# Patient Record
Sex: FEMALE | Race: BLACK OR AFRICAN AMERICAN | NOT HISPANIC OR LATINO
[De-identification: names, ages, dates, MRNs, and addresses within clinical notes are randomized per-mention and may not be internally consistent; named-entity substitution may affect disease eponyms.]

---

## 2019-09-30 ENCOUNTER — APPOINTMENT (OUTPATIENT)
Dept: ANTEPARTUM | Facility: CLINIC | Age: 34
End: 2019-09-30
Payer: MEDICAID

## 2019-09-30 ENCOUNTER — ASOB RESULT (OUTPATIENT)
Age: 34
End: 2019-09-30

## 2019-09-30 PROCEDURE — 76813 OB US NUCHAL MEAS 1 GEST: CPT

## 2019-09-30 PROCEDURE — 36416 COLLJ CAPILLARY BLOOD SPEC: CPT

## 2019-10-02 ENCOUNTER — APPOINTMENT (OUTPATIENT)
Dept: MATERNAL FETAL MEDICINE | Facility: CLINIC | Age: 34
End: 2019-10-02
Payer: MEDICAID

## 2019-10-02 ENCOUNTER — ASOB RESULT (OUTPATIENT)
Age: 34
End: 2019-10-02

## 2019-10-02 DIAGNOSIS — Z78.9 OTHER SPECIFIED HEALTH STATUS: ICD-10-CM

## 2019-10-02 DIAGNOSIS — R73.03 PREDIABETES.: ICD-10-CM

## 2019-10-02 DIAGNOSIS — Z83.3 FAMILY HISTORY OF DIABETES MELLITUS: ICD-10-CM

## 2019-10-02 PROCEDURE — G0108 DIAB MANAGE TRN  PER INDIV: CPT

## 2019-10-02 RX ORDER — ISOPROPYL ALCOHOL 0.7 ML/ML
SWAB TOPICAL
Qty: 1 | Refills: 2 | Status: ACTIVE | COMMUNITY
Start: 2019-10-02 | End: 1900-01-01

## 2019-10-02 RX ORDER — BLOOD SUGAR DIAGNOSTIC
STRIP MISCELLANEOUS
Qty: 150 | Refills: 2 | Status: ACTIVE | COMMUNITY
Start: 2019-10-02 | End: 1900-01-01

## 2019-10-02 RX ORDER — LANCETS
EACH MISCELLANEOUS
Qty: 150 | Refills: 2 | Status: ACTIVE | COMMUNITY
Start: 2019-10-02 | End: 1900-01-01

## 2019-10-03 VITALS — BODY MASS INDEX: 39.46 KG/M2 | WEIGHT: 245.5 LBS | HEIGHT: 66 IN

## 2019-10-03 PROBLEM — Z78.9 DOES NOT USE ILLICIT DRUGS: Status: ACTIVE | Noted: 2019-10-03

## 2019-10-03 PROBLEM — R73.03 PRE-DIABETES: Status: RESOLVED | Noted: 2019-10-03 | Resolved: 2019-10-03

## 2019-10-03 PROBLEM — Z78.9 DENIES ALCOHOL CONSUMPTION: Status: ACTIVE | Noted: 2019-10-03

## 2019-10-03 PROBLEM — Z83.3 FAMILY HISTORY OF DIABETES MELLITUS: Status: ACTIVE | Noted: 2019-10-03

## 2019-10-07 LAB
1ST TRIMESTER DATA: NORMAL
ADDENDUM DOC: NORMAL
AFP PNL SERPL: NORMAL
AFP SERPL-ACNC: NORMAL
CLINICAL BIOCHEMIST REVIEW: NORMAL
FREE BETA HCG 1ST TRIMESTER: NORMAL
Lab: NORMAL
NOTES NTD: NORMAL
NT: NORMAL
PAPP-A SERPL-ACNC: NORMAL
TRISOMY 18/3: NORMAL

## 2019-10-12 ENCOUNTER — APPOINTMENT (OUTPATIENT)
Dept: MATERNAL FETAL MEDICINE | Facility: CLINIC | Age: 34
End: 2019-10-12
Payer: MEDICAID

## 2019-10-12 ENCOUNTER — APPOINTMENT (OUTPATIENT)
Dept: ANTEPARTUM | Facility: CLINIC | Age: 34
End: 2019-10-12

## 2019-10-12 VITALS
HEIGHT: 66 IN | WEIGHT: 244 LBS | SYSTOLIC BLOOD PRESSURE: 122 MMHG | BODY MASS INDEX: 39.21 KG/M2 | DIASTOLIC BLOOD PRESSURE: 74 MMHG

## 2019-10-12 DIAGNOSIS — Z82.49 FAMILY HISTORY OF ISCHEMIC HEART DISEASE AND OTHER DISEASES OF THE CIRCULATORY SYSTEM: ICD-10-CM

## 2019-10-12 PROCEDURE — 99205 OFFICE O/P NEW HI 60 MIN: CPT

## 2019-10-12 RX ORDER — NITROFURANTOIN (MONOHYDRATE/MACROCRYSTALS) 25; 75 MG/1; MG/1
100 CAPSULE ORAL
Qty: 10 | Refills: 0 | Status: DISCONTINUED | COMMUNITY
Start: 2019-06-15

## 2019-10-12 RX ORDER — PNV 112/IRON/FOLIC/OM3/DHA/EPA 3.33-.33MG
3.33-0.333-34.8 TABLET,CHEWABLE ORAL
Refills: 0 | Status: ACTIVE | COMMUNITY

## 2019-10-12 NOTE — DATA REVIEWED
[FreeTextEntry1] : We discussed the risks of pregestational diabetes to both her health as well as the health of the pregnancy. \par \par We discussed the importance of consistency with the diet, and to get the glucose under control as quickly as possible.  At the initial nutrition consultation, her diet seemed to be unrestricted with many high sugar low nutrient choices.  Since that time, she has made some real changes to he diet.  But she still is somewhat noncompliant.  Her glucose levels are mostly elevated, however with the lack of consistency of the diet\par \par We discussed the need for increased testing throughout the pregnancy and the increased incidences of complications of pregnancy such as congenital heart defects, macrosomia,  delivery and preeclampsia.

## 2019-10-12 NOTE — DISCUSSION/SUMMARY
[FreeTextEntry1] : To bring her overall glucose levels under better control, we will start Glyburide 5mg BID.  She was counsele extensively about the importance of regularity for her daily routine and dietary choices. We reviewed many examples of good food choices and reviewed some of her bad choices to avoid.\par \par She will continur 4X daily testing and followup with the nutritionist in 2 weeks. \par \par Level 2 sonogram at 20 weeks. \par \par Fetal echo is suggested due to likely pregestational diabetes.

## 2019-10-12 NOTE — HISTORY OF PRESENT ILLNESS
[FreeTextEntry1] : Vijaya presents to discuss her pregestational diabetes.  She recently met with the nutritionist and has been checking her glucose at home

## 2019-11-05 ENCOUNTER — APPOINTMENT (OUTPATIENT)
Dept: MATERNAL FETAL MEDICINE | Facility: CLINIC | Age: 34
End: 2019-11-05
Payer: MEDICAID

## 2019-11-05 ENCOUNTER — ASOB RESULT (OUTPATIENT)
Age: 34
End: 2019-11-05

## 2019-11-05 VITALS — HEIGHT: 66 IN | WEIGHT: 245.5 LBS | BODY MASS INDEX: 39.46 KG/M2

## 2019-11-05 DIAGNOSIS — O99.810 ABNORMAL GLUCOSE COMPLICATING PREGNANCY: ICD-10-CM

## 2019-11-05 PROCEDURE — G0108 DIAB MANAGE TRN  PER INDIV: CPT

## 2019-11-05 RX ORDER — ISOPROPYL ALCOHOL 0.7 ML/ML
SWAB TOPICAL
Qty: 2 | Refills: 1 | Status: ACTIVE | COMMUNITY
Start: 2019-11-05 | End: 1900-01-01

## 2019-11-05 RX ORDER — GLYBURIDE 5 MG/1
5 TABLET ORAL
Qty: 60 | Refills: 1 | Status: DISCONTINUED | COMMUNITY
Start: 2019-10-12 | End: 2019-11-05

## 2019-11-08 ENCOUNTER — OTHER (OUTPATIENT)
Age: 34
End: 2019-11-08

## 2019-11-22 ENCOUNTER — ASOB RESULT (OUTPATIENT)
Age: 34
End: 2019-11-22

## 2019-11-22 ENCOUNTER — APPOINTMENT (OUTPATIENT)
Dept: MATERNAL FETAL MEDICINE | Facility: CLINIC | Age: 34
End: 2019-11-22

## 2019-11-22 ENCOUNTER — APPOINTMENT (OUTPATIENT)
Dept: MATERNAL FETAL MEDICINE | Facility: CLINIC | Age: 34
End: 2019-11-22
Payer: MEDICAID

## 2019-11-22 ENCOUNTER — APPOINTMENT (OUTPATIENT)
Dept: ANTEPARTUM | Facility: CLINIC | Age: 34
End: 2019-11-22

## 2019-11-22 VITALS — BODY MASS INDEX: 39.62 KG/M2 | HEIGHT: 66 IN | WEIGHT: 246.5 LBS

## 2019-11-22 DIAGNOSIS — E66.01 OBESITY COMPLICATING PREGNANCY, FIRST TRIMESTER: ICD-10-CM

## 2019-11-22 DIAGNOSIS — O99.211 OBESITY COMPLICATING PREGNANCY, FIRST TRIMESTER: ICD-10-CM

## 2019-11-22 PROCEDURE — G0108 DIAB MANAGE TRN  PER INDIV: CPT

## 2019-11-23 LAB
ESTIMATED AVERAGE GLUCOSE: 143 MG/DL
HBA1C MFR BLD HPLC: 6.6 %

## 2019-12-07 ENCOUNTER — APPOINTMENT (OUTPATIENT)
Dept: ANTEPARTUM | Facility: CLINIC | Age: 34
End: 2019-12-07
Payer: MEDICAID

## 2019-12-07 ENCOUNTER — APPOINTMENT (OUTPATIENT)
Dept: MATERNAL FETAL MEDICINE | Facility: CLINIC | Age: 34
End: 2019-12-07

## 2019-12-07 ENCOUNTER — ASOB RESULT (OUTPATIENT)
Age: 34
End: 2019-12-07

## 2019-12-07 ENCOUNTER — APPOINTMENT (OUTPATIENT)
Dept: MATERNAL FETAL MEDICINE | Facility: CLINIC | Age: 34
End: 2019-12-07
Payer: MEDICAID

## 2019-12-07 VITALS
HEIGHT: 66 IN | HEART RATE: 98 BPM | OXYGEN SATURATION: 98 % | WEIGHT: 248.06 LBS | SYSTOLIC BLOOD PRESSURE: 90 MMHG | DIASTOLIC BLOOD PRESSURE: 60 MMHG | RESPIRATION RATE: 18 BRPM | BODY MASS INDEX: 39.87 KG/M2

## 2019-12-07 DIAGNOSIS — Z00.00 ENCOUNTER FOR GENERAL ADULT MEDICAL EXAMINATION W/OUT ABNORMAL FINDINGS: ICD-10-CM

## 2019-12-07 PROCEDURE — 99215 OFFICE O/P EST HI 40 MIN: CPT

## 2019-12-07 PROCEDURE — 76811 OB US DETAILED SNGL FETUS: CPT

## 2019-12-07 RX ORDER — PNV,CALCIUM 72/IRON/FOLIC ACID 27 MG-1 MG
27-1 TABLET ORAL
Qty: 30 | Refills: 0 | Status: ACTIVE | COMMUNITY
Start: 2019-08-07

## 2019-12-07 RX ORDER — AZITHROMYCIN 250 MG/1
250 TABLET, FILM COATED ORAL
Qty: 4 | Refills: 0 | Status: DISCONTINUED | COMMUNITY
Start: 2019-08-14

## 2019-12-07 RX ORDER — AMPICILLIN 500 MG/1
500 CAPSULE ORAL
Qty: 40 | Refills: 0 | Status: DISCONTINUED | COMMUNITY
Start: 2019-10-30

## 2019-12-07 NOTE — DISCUSSION/SUMMARY
[FreeTextEntry1] : A fetal echo is scheduled due to pregestational diabetes\par \par She will monitor her fasting values with the instructions to increase to 48u QHS if they continue to be elevated. \par \par A followup dietary counseling appointment is made as is a repeat growth scan in 4 weeks.

## 2019-12-07 NOTE — HISTORY OF PRESENT ILLNESS
[FreeTextEntry1] : Vijaya presents today for her Level 2 sonogram. She has been taking her NPH insulin at bedtime and recently increased to 44u QHS

## 2019-12-07 NOTE — DATA REVIEWED
[FreeTextEntry1] : Sonogram today shows overall normal appearances. The fetal size is AGA with normal fluid noted.\par \par Review of glucose shows overall good control post meals with occasional elevations due to food choices. Her fasting still seem a bit elevated, however she increased the insulin only 3 days ago\par \par She is planning a vacation of the next few weeks and we discussed dietary strategies to try to keep her glucose levels in check

## 2019-12-10 ENCOUNTER — OUTPATIENT (OUTPATIENT)
Dept: OUTPATIENT SERVICES | Age: 34
LOS: 1 days | Discharge: HOME | End: 2019-12-10

## 2019-12-11 ENCOUNTER — APPOINTMENT (OUTPATIENT)
Dept: PEDIATRIC CARDIOLOGY | Facility: CLINIC | Age: 34
End: 2019-12-11
Payer: MEDICAID

## 2019-12-11 PROCEDURE — 76827 ECHO EXAM OF FETAL HEART: CPT

## 2019-12-11 PROCEDURE — 76820 UMBILICAL ARTERY ECHO: CPT

## 2019-12-11 PROCEDURE — 93325 DOPPLER ECHO COLOR FLOW MAPG: CPT | Mod: 59

## 2019-12-11 PROCEDURE — 76821 MIDDLE CEREBRAL ARTERY ECHO: CPT

## 2019-12-11 PROCEDURE — 99201 OFFICE OUTPATIENT NEW 10 MINUTES: CPT | Mod: 25

## 2019-12-11 PROCEDURE — 76825 ECHO EXAM OF FETAL HEART: CPT

## 2020-01-06 ENCOUNTER — APPOINTMENT (OUTPATIENT)
Dept: ANTEPARTUM | Facility: CLINIC | Age: 35
End: 2020-01-06
Payer: MEDICAID

## 2020-01-06 ENCOUNTER — ASOB RESULT (OUTPATIENT)
Age: 35
End: 2020-01-06

## 2020-01-06 ENCOUNTER — APPOINTMENT (OUTPATIENT)
Dept: MATERNAL FETAL MEDICINE | Facility: CLINIC | Age: 35
End: 2020-01-06
Payer: MEDICAID

## 2020-01-06 VITALS
BODY MASS INDEX: 40.18 KG/M2 | HEART RATE: 98 BPM | SYSTOLIC BLOOD PRESSURE: 90 MMHG | WEIGHT: 250 LBS | DIASTOLIC BLOOD PRESSURE: 60 MMHG | HEIGHT: 66 IN

## 2020-01-06 PROCEDURE — 99214 OFFICE O/P EST MOD 30 MIN: CPT

## 2020-01-06 PROCEDURE — 76816 OB US FOLLOW-UP PER FETUS: CPT

## 2020-01-06 NOTE — DISCUSSION/SUMMARY
[FreeTextEntry1] : Please see the previous consultation the patient's obstetrical history. She is currently 26 weeks pregnant with pre-existing diabetes and advanced maternal age. She is currently on 56 units of NPH insulin at bedtime.\par \par A growth scan was performed today which reveals a single viable intrauterine gestation with estimated fetal weight of 2 lbs. 2 oz. which is consistent with the 46th percentile. Breech presentation with posterior placenta was seen and the amniotic fluid index is normal. Evaluation of her diabetic flow sheets reveals better control of her fasting blood sugars, however the majority are still elevated. Almost all of her postprandial blood sugars are found to be within normal limits. Vital signs today reveal a blood pressure of 90/60, maternal weight is 250 pounds which is a 2 pound weight gain from the evaluation of December 7. This is consistent with a BMI of 40.35KG.\par \par Management of the pregnancy was discussed. At this time the patient was advised to increase to 60 units of NPH insulin at bedtime and will continue to increase by 2 units every 2 days until fasting blood sugars are under 90. Morning and premeal insulin are  not indicated at this time. HgbA1c was performed in November and was elevated at 6.6%. Repeat testing in February will be recommended. She will return in 2 weeks for a nutrition consultation and will return in one month for a followup growth scan and maternal fetal medicine consultation. No other changes in the current medical management are indicated. All of the above was discussed the patient and all questions were answered.\par \par I spent a total of 30 minutes of which greater than 50% was counseling and coordinating care.\par \par Recommendations;\par \par #1. Increased to 60 units NPH insulin at bedtime.\par #2. Dietary consultation in 2 weeks.\par #3. Growth scan and maternal fetal medicine consultation one month.\par #4. Repeat hemoglobin A1c in February.

## 2020-01-10 ENCOUNTER — APPOINTMENT (OUTPATIENT)
Dept: MATERNAL FETAL MEDICINE | Facility: CLINIC | Age: 35
End: 2020-01-10

## 2020-01-21 ENCOUNTER — ASOB RESULT (OUTPATIENT)
Age: 35
End: 2020-01-21

## 2020-01-21 ENCOUNTER — APPOINTMENT (OUTPATIENT)
Dept: MATERNAL FETAL MEDICINE | Facility: CLINIC | Age: 35
End: 2020-01-21
Payer: MEDICAID

## 2020-01-21 VITALS — BODY MASS INDEX: 40.52 KG/M2 | WEIGHT: 252.13 LBS | HEIGHT: 66 IN

## 2020-01-21 PROCEDURE — G0108 DIAB MANAGE TRN  PER INDIV: CPT

## 2020-02-07 ENCOUNTER — ASOB RESULT (OUTPATIENT)
Age: 35
End: 2020-02-07

## 2020-02-07 ENCOUNTER — APPOINTMENT (OUTPATIENT)
Dept: ANTEPARTUM | Facility: CLINIC | Age: 35
End: 2020-02-07
Payer: MEDICAID

## 2020-02-07 ENCOUNTER — APPOINTMENT (OUTPATIENT)
Dept: MATERNAL FETAL MEDICINE | Facility: CLINIC | Age: 35
End: 2020-02-07
Payer: MEDICAID

## 2020-02-07 VITALS
RESPIRATION RATE: 16 BRPM | HEIGHT: 66 IN | HEART RATE: 89 BPM | DIASTOLIC BLOOD PRESSURE: 58 MMHG | OXYGEN SATURATION: 98 % | SYSTOLIC BLOOD PRESSURE: 102 MMHG | BODY MASS INDEX: 40.39 KG/M2 | WEIGHT: 251.31 LBS

## 2020-02-07 PROCEDURE — 76816 OB US FOLLOW-UP PER FETUS: CPT

## 2020-02-07 PROCEDURE — 76819 FETAL BIOPHYS PROFIL W/O NST: CPT

## 2020-02-07 PROCEDURE — 76820 UMBILICAL ARTERY ECHO: CPT

## 2020-02-07 PROCEDURE — 99213 OFFICE O/P EST LOW 20 MIN: CPT

## 2020-02-07 PROCEDURE — 93976 VASCULAR STUDY: CPT

## 2020-02-07 NOTE — DISCUSSION/SUMMARY
[FreeTextEntry1] : Please see the previous consultation for the patient's medical and obstetrical history. She is being seen today at 31 weeks for advanced maternal age, pre-gestational diabetes, maternal obesity and marginal insertion of the umbilical cord. She is on 68 units of NPH insulin at bedtime.\par \par Evaluation of her diabetic flow sheets continues to show improvemed control of her fasting and postprandial blood sugars. She has some elevations of her fasting blood sugars and many of her post dinner blood sugars are elevated. A growth scan was performed today which reveals a single viable intrauterine gestation with the estimated fetal weights at 3 lbs. 11 oz. which is consistent with the 38th percentile. Vertex presentation with a posterior placenta is seen and the amniotic fluid index is normal at 11.78. Normal umbilical Doppler study noted. Vital signs today reveal a blood pressure of 102/58 and she weighs 251.5 pounds which is a 1 pound weight loss from evaluation done on January 21. This is consistent with a BMI of 40.56KG.\par \par Management of the pregnancy was discussed. The patient will continue to increase by 2 units every 2 days at bedtime to maintain fasting blood sugar 90. In addition she will add  10 units of NPH insulin at 2:00 in the afternoon to cover her lunch and dinner blood sugars. Repeat hemoglobin A1c was obtained today. Weekly biophysical profile and NST until delivery recommended. A growth scan in one month is also recommended. Followup nutritional consultation in 2 weeks as scheduled. All of the above was discussed with the patient and all her questions were answered.\par \par I spent a total of 40 minutes of which greater than 50% was counseling and coordinating care.\par \par Recommendations;\par \par #1. Continue 68 units of NPH insulin at bedtime.\par #2. Begin 10 units of NPH insulin at 2:00 in the afternoon.\par #3. Weekly biophysical profile and NST until delivery.\par #4. Growth scan and followup maternal fetal medicine consultation in one month.\par #5. Dietary consultation in 2 weeks is scheduled.\par #6. Hemoglobin A1c obtained today.

## 2020-02-08 LAB
ESTIMATED AVERAGE GLUCOSE: 140 MG/DL
HBA1C MFR BLD HPLC: 6.5 %

## 2020-02-10 RX ORDER — PEN NEEDLE, DIABETIC 29 G X1/2"
32G X 4 MM NEEDLE, DISPOSABLE MISCELLANEOUS
Qty: 1 | Refills: 1 | Status: ACTIVE | COMMUNITY
Start: 2019-11-05 | End: 1900-01-01

## 2020-02-13 ENCOUNTER — APPOINTMENT (OUTPATIENT)
Dept: ANTEPARTUM | Facility: CLINIC | Age: 35
End: 2020-02-13
Payer: MEDICAID

## 2020-02-13 ENCOUNTER — ASOB RESULT (OUTPATIENT)
Age: 35
End: 2020-02-13

## 2020-02-13 PROCEDURE — 76818 FETAL BIOPHYS PROFILE W/NST: CPT

## 2020-02-20 ENCOUNTER — APPOINTMENT (OUTPATIENT)
Dept: ANTEPARTUM | Facility: CLINIC | Age: 35
End: 2020-02-20
Payer: MEDICAID

## 2020-02-20 ENCOUNTER — APPOINTMENT (OUTPATIENT)
Dept: MATERNAL FETAL MEDICINE | Facility: CLINIC | Age: 35
End: 2020-02-20
Payer: MEDICAID

## 2020-02-20 ENCOUNTER — ASOB RESULT (OUTPATIENT)
Age: 35
End: 2020-02-20

## 2020-02-20 VITALS — WEIGHT: 253 LBS | HEIGHT: 66 IN | BODY MASS INDEX: 40.66 KG/M2

## 2020-02-20 PROCEDURE — 76818 FETAL BIOPHYS PROFILE W/NST: CPT

## 2020-02-20 PROCEDURE — G0108 DIAB MANAGE TRN  PER INDIV: CPT

## 2020-02-27 ENCOUNTER — APPOINTMENT (OUTPATIENT)
Dept: ANTEPARTUM | Facility: CLINIC | Age: 35
End: 2020-02-27
Payer: MEDICAID

## 2020-02-27 ENCOUNTER — APPOINTMENT (OUTPATIENT)
Dept: MATERNAL FETAL MEDICINE | Facility: CLINIC | Age: 35
End: 2020-02-27
Payer: MEDICAID

## 2020-02-27 ENCOUNTER — ASOB RESULT (OUTPATIENT)
Age: 35
End: 2020-02-27

## 2020-02-27 VITALS — HEIGHT: 66 IN | BODY MASS INDEX: 40.73 KG/M2 | WEIGHT: 253.44 LBS

## 2020-02-27 PROCEDURE — G0108 DIAB MANAGE TRN  PER INDIV: CPT

## 2020-02-27 PROCEDURE — 76818 FETAL BIOPHYS PROFILE W/NST: CPT

## 2020-03-05 ENCOUNTER — ASOB RESULT (OUTPATIENT)
Age: 35
End: 2020-03-05

## 2020-03-05 ENCOUNTER — APPOINTMENT (OUTPATIENT)
Dept: ANTEPARTUM | Facility: CLINIC | Age: 35
End: 2020-03-05
Payer: COMMERCIAL

## 2020-03-05 ENCOUNTER — APPOINTMENT (OUTPATIENT)
Dept: MATERNAL FETAL MEDICINE | Facility: CLINIC | Age: 35
End: 2020-03-05
Payer: MEDICAID

## 2020-03-05 VITALS — HEIGHT: 66 IN | WEIGHT: 259.13 LBS | BODY MASS INDEX: 41.65 KG/M2

## 2020-03-05 PROCEDURE — 76816 OB US FOLLOW-UP PER FETUS: CPT

## 2020-03-05 PROCEDURE — 59025 FETAL NON-STRESS TEST: CPT

## 2020-03-05 PROCEDURE — G0108 DIAB MANAGE TRN  PER INDIV: CPT

## 2020-03-13 ENCOUNTER — ASOB RESULT (OUTPATIENT)
Age: 35
End: 2020-03-13

## 2020-03-13 ENCOUNTER — APPOINTMENT (OUTPATIENT)
Dept: ANTEPARTUM | Facility: CLINIC | Age: 35
End: 2020-03-13
Payer: COMMERCIAL

## 2020-03-13 PROCEDURE — 76818 FETAL BIOPHYS PROFILE W/NST: CPT

## 2020-03-19 ENCOUNTER — ASOB RESULT (OUTPATIENT)
Age: 35
End: 2020-03-19

## 2020-03-19 ENCOUNTER — APPOINTMENT (OUTPATIENT)
Dept: ANTEPARTUM | Facility: CLINIC | Age: 35
End: 2020-03-19
Payer: COMMERCIAL

## 2020-03-19 ENCOUNTER — APPOINTMENT (OUTPATIENT)
Dept: MATERNAL FETAL MEDICINE | Facility: CLINIC | Age: 35
End: 2020-03-19
Payer: MEDICAID

## 2020-03-19 VITALS — WEIGHT: 261.81 LBS | HEIGHT: 66 IN | BODY MASS INDEX: 42.07 KG/M2

## 2020-03-19 DIAGNOSIS — O24.319 UNSPECIFIED PRE-EXISTING DIABETES MELLITUS IN PREGNANCY, UNSPECIFIED TRIMESTER: ICD-10-CM

## 2020-03-19 DIAGNOSIS — E66.01 OBESITY COMPLICATING PREGNANCY, SECOND TRIMESTER: ICD-10-CM

## 2020-03-19 DIAGNOSIS — O09.519 SUPERVISION OF ELDERLY PRIMIGRAVIDA, UNSPECIFIED TRIMESTER: ICD-10-CM

## 2020-03-19 DIAGNOSIS — O99.212 OBESITY COMPLICATING PREGNANCY, SECOND TRIMESTER: ICD-10-CM

## 2020-03-19 PROCEDURE — 76816 OB US FOLLOW-UP PER FETUS: CPT

## 2020-03-19 PROCEDURE — G0108 DIAB MANAGE TRN  PER INDIV: CPT

## 2020-03-19 RX ORDER — INSULIN HUMAN 100 [IU]/ML
100 INJECTION, SUSPENSION SUBCUTANEOUS
Qty: 3 | Refills: 1 | Status: ACTIVE | COMMUNITY
Start: 2019-11-05 | End: 1900-01-01

## 2020-03-25 ENCOUNTER — INPATIENT (INPATIENT)
Facility: HOSPITAL | Age: 35
LOS: 3 days | Discharge: HOME | End: 2020-03-29
Attending: OBSTETRICS & GYNECOLOGY | Admitting: OBSTETRICS & GYNECOLOGY

## 2020-03-25 VITALS
HEART RATE: 109 BPM | SYSTOLIC BLOOD PRESSURE: 130 MMHG | TEMPERATURE: 99 F | DIASTOLIC BLOOD PRESSURE: 82 MMHG | RESPIRATION RATE: 18 BRPM

## 2020-03-25 DIAGNOSIS — Z78.9 OTHER SPECIFIED HEALTH STATUS: ICD-10-CM

## 2020-03-25 DIAGNOSIS — O26.899 OTHER SPECIFIED PREGNANCY RELATED CONDITIONS, UNSPECIFIED TRIMESTER: ICD-10-CM

## 2020-03-25 DIAGNOSIS — Z3A.00 WEEKS OF GESTATION OF PREGNANCY NOT SPECIFIED: ICD-10-CM

## 2020-03-25 LAB
BASOPHILS # BLD AUTO: 0.03 K/UL — SIGNIFICANT CHANGE UP (ref 0–0.2)
BASOPHILS NFR BLD AUTO: 0.3 % — SIGNIFICANT CHANGE UP (ref 0–2)
BLD GP AB SCN SERPL QL: NEGATIVE — SIGNIFICANT CHANGE UP
EOSINOPHIL # BLD AUTO: 0.04 K/UL — SIGNIFICANT CHANGE UP (ref 0–0.5)
EOSINOPHIL NFR BLD AUTO: 0.3 % — SIGNIFICANT CHANGE UP (ref 0–6)
GLUCOSE BLDC GLUCOMTR-MCNC: 103 MG/DL — HIGH (ref 70–99)
GLUCOSE BLDC GLUCOMTR-MCNC: 105 MG/DL — HIGH (ref 70–99)
GLUCOSE BLDC GLUCOMTR-MCNC: 111 MG/DL — HIGH (ref 70–99)
GLUCOSE BLDC GLUCOMTR-MCNC: 115 MG/DL — HIGH (ref 70–99)
GLUCOSE BLDC GLUCOMTR-MCNC: 119 MG/DL — HIGH (ref 70–99)
GLUCOSE BLDC GLUCOMTR-MCNC: 128 MG/DL — HIGH (ref 70–99)
GLUCOSE BLDC GLUCOMTR-MCNC: 131 MG/DL — HIGH (ref 70–99)
GLUCOSE BLDC GLUCOMTR-MCNC: 132 MG/DL — HIGH (ref 70–99)
GLUCOSE BLDC GLUCOMTR-MCNC: 136 MG/DL — HIGH (ref 70–99)
GLUCOSE BLDC GLUCOMTR-MCNC: 148 MG/DL — HIGH (ref 70–99)
GLUCOSE BLDC GLUCOMTR-MCNC: 82 MG/DL — SIGNIFICANT CHANGE UP (ref 70–99)
HCT VFR BLD CALC: 36.7 % — SIGNIFICANT CHANGE UP (ref 34.5–45)
HGB BLD-MCNC: 12.2 G/DL — SIGNIFICANT CHANGE UP (ref 11.5–15.5)
IMM GRANULOCYTES NFR BLD AUTO: 0.4 % — SIGNIFICANT CHANGE UP (ref 0–1.5)
LYMPHOCYTES # BLD AUTO: 1.98 K/UL — SIGNIFICANT CHANGE UP (ref 1–3.3)
LYMPHOCYTES # BLD AUTO: 17.1 % — SIGNIFICANT CHANGE UP (ref 13–44)
MCHC RBC-ENTMCNC: 30.1 PG — SIGNIFICANT CHANGE UP (ref 27–34)
MCHC RBC-ENTMCNC: 33.2 % — SIGNIFICANT CHANGE UP (ref 32–36)
MCV RBC AUTO: 90.6 FL — SIGNIFICANT CHANGE UP (ref 80–100)
MONOCYTES # BLD AUTO: 0.63 K/UL — SIGNIFICANT CHANGE UP (ref 0–0.9)
MONOCYTES NFR BLD AUTO: 5.5 % — SIGNIFICANT CHANGE UP (ref 2–14)
NEUTROPHILS # BLD AUTO: 8.82 K/UL — HIGH (ref 1.8–7.4)
NEUTROPHILS NFR BLD AUTO: 76.4 % — SIGNIFICANT CHANGE UP (ref 43–77)
NRBC # FLD: 0 K/UL — SIGNIFICANT CHANGE UP (ref 0–0)
PLATELET # BLD AUTO: 256 K/UL — SIGNIFICANT CHANGE UP (ref 150–400)
PMV BLD: 11.1 FL — SIGNIFICANT CHANGE UP (ref 7–13)
RBC # BLD: 4.05 M/UL — SIGNIFICANT CHANGE UP (ref 3.8–5.2)
RBC # FLD: 14.5 % — SIGNIFICANT CHANGE UP (ref 10.3–14.5)
RH IG SCN BLD-IMP: POSITIVE — SIGNIFICANT CHANGE UP
RH IG SCN BLD-IMP: POSITIVE — SIGNIFICANT CHANGE UP
WBC # BLD: 11.55 K/UL — HIGH (ref 3.8–10.5)
WBC # FLD AUTO: 11.55 K/UL — HIGH (ref 3.8–10.5)

## 2020-03-25 RX ORDER — SODIUM CHLORIDE 9 MG/ML
1000 INJECTION INTRAMUSCULAR; INTRAVENOUS; SUBCUTANEOUS
Refills: 0 | Status: DISCONTINUED | OUTPATIENT
Start: 2020-03-25 | End: 2020-03-27

## 2020-03-25 RX ORDER — INSULIN HUMAN 100 [IU]/ML
1 INJECTION, SOLUTION SUBCUTANEOUS
Qty: 100 | Refills: 0 | Status: DISCONTINUED | OUTPATIENT
Start: 2020-03-25 | End: 2020-03-25

## 2020-03-25 RX ORDER — BUTORPHANOL TARTRATE 2 MG/ML
2 INJECTION, SOLUTION INTRAMUSCULAR; INTRAVENOUS ONCE
Refills: 0 | Status: DISCONTINUED | OUTPATIENT
Start: 2020-03-25 | End: 2020-03-25

## 2020-03-25 RX ORDER — SODIUM CHLORIDE 9 MG/ML
1000 INJECTION, SOLUTION INTRAVENOUS
Refills: 0 | Status: DISCONTINUED | OUTPATIENT
Start: 2020-03-25 | End: 2020-03-27

## 2020-03-25 RX ORDER — INSULIN HUMAN 100 [IU]/ML
2 INJECTION, SOLUTION SUBCUTANEOUS
Qty: 100 | Refills: 0 | Status: DISCONTINUED | OUTPATIENT
Start: 2020-03-25 | End: 2020-03-26

## 2020-03-25 RX ORDER — OXYTOCIN 10 UNIT/ML
333.33 VIAL (ML) INJECTION
Qty: 20 | Refills: 0 | Status: COMPLETED | OUTPATIENT
Start: 2020-03-25 | End: 2020-03-25

## 2020-03-25 RX ADMIN — BUTORPHANOL TARTRATE 2 MILLIGRAM(S): 2 INJECTION, SOLUTION INTRAMUSCULAR; INTRAVENOUS at 18:52

## 2020-03-25 RX ADMIN — BUTORPHANOL TARTRATE 2 MILLIGRAM(S): 2 INJECTION, SOLUTION INTRAMUSCULAR; INTRAVENOUS at 18:05

## 2020-03-25 RX ADMIN — SODIUM CHLORIDE 125 MILLILITER(S): 9 INJECTION, SOLUTION INTRAVENOUS at 20:22

## 2020-03-25 RX ADMIN — INSULIN HUMAN 1 UNIT(S)/HR: 100 INJECTION, SOLUTION SUBCUTANEOUS at 20:22

## 2020-03-25 RX ADMIN — INSULIN HUMAN 1 UNIT(S)/HR: 100 INJECTION, SOLUTION SUBCUTANEOUS at 17:08

## 2020-03-25 RX ADMIN — SODIUM CHLORIDE 30 MILLILITER(S): 9 INJECTION INTRAMUSCULAR; INTRAVENOUS; SUBCUTANEOUS at 21:21

## 2020-03-25 NOTE — OB PROVIDER TRIAGE NOTE - NSOBPROVIDERNOTE_OBGYN_ALL_OB_FT
Patient is a y/o  @ 40wks gestation who was found to have  variables on FHT in the office.   Patient is also a pregestational diabetic on insulin.  Patient was sent in for admission for IOL    AP Course complicated by pregestational diabetes; on insulin  Meds - pnv & Insulin ( NPH 16units am & 90units hs)  NKDA    PMH - pregestational diabetes; dx'ed 2yrs ago  OB - present    Abdomen gravid, soft and nontender  Continue EFM  SVE -/-3 Intact  Cephalic presentation  GBS - negative  Clinical EFW - 3100G    Discussed patient with Dr Dejesus.  Plan:  admit to L&D for IOL  For PO Cytotec. Patient is a y/o  @ 40wks gestation who was found to have  variables on FHT in the office.   Patient is also a pregestational diabetic on insulin.  Patient was sent in for admission for IOL    AP Course complicated by pregestational diabetes; on insulin  Meds - pnv & Insulin ( NPH 16units am & 90units hs)  NKDA    PMH - pregestational diabetes; dx'ed 2yrs ago  OB - present    Abdomen gravid, soft and nontender  Continue EFM  SVE -/-3 Intact  SSE - neg for lesions;  HSV IgG pos  Cephalic presentation  GBS - negative  Clinical EFW - 3100G    Discussed patient with Dr Dejesus.  Plan:  admit to L&D for IOL  For PO Cytotec.

## 2020-03-25 NOTE — CHART NOTE - NSCHARTNOTEFT_GEN_A_CORE
NP note    Pt seen for increased pain    Vital Signs Last 24 Hrs  T(C): 36.8 (25 Mar 2020 15:01), Max: 37.1 (25 Mar 2020 12:36)  T(F): 98.2 (25 Mar 2020 15:01), Max: 98.8 (25 Mar 2020 12:36)  HR: 108 (25 Mar 2020 15:01) (98 - 123)  BP: 109/74 (25 Mar 2020 15:01) (109/74 - 130/82)  RR: 18 (25 Mar 2020 15:01) (18 - 18)  SpO2: 99% (25 Mar 2020 15:01) (99% - 99%)  /moderate variability/+ accels/no decels  [place] irregular  SVE 1/50/-3    Cervical balloon placed without incident. Pt tolerated well.     -Maintain cervical balloon  -Continue PO cytotec  -Continue insulin gtt for T2DM  -D/W Dr. Anjelica bañuelos, NP

## 2020-03-25 NOTE — OB PROVIDER TRIAGE NOTE - HISTORY OF PRESENT ILLNESS
Patient is a y/o  @ 40wks gestation who was found to have  variables on FHT in the office.   Patient is also a pregestational diabetic on insulin.  Patient was sent in for admission for IOL    AP Course complicated by pregestational diabetes; on insulin  Meds - pnv & Insulin ( NPH 16units am & 90units hs)  NKDA

## 2020-03-25 NOTE — OB PROVIDER H&P - NSHPPHYSICALEXAM_GEN_ALL_CORE
Vital Signs  T(C): 37.1 (25 Mar 2020 12:50), Max: 37.1 (25 Mar 2020 12:36)  T(F): 98.78 (25 Mar 2020 12:50), Max: 98.8 (25 Mar 2020 12:36)  HR: 98 (25 Mar 2020 13:18) (98 - 113)  BP: 119/74 (25 Mar 2020 13:18) (119/74 - 130/82)  RR: 18 (25 Mar 2020 12:50) (18 - 18)    Abdomen gravid, soft and nontender  Continue EFM  SVE -1/50/-3 Intact  Cephalic presentation  GBS - negative  Clinical EFW - 3100G

## 2020-03-25 NOTE — OB RN TRIAGE NOTE - PMH
<<----- Click to add NO pertinent Past Medical History No pertinent past medical history Herpes simplex virus (HSV) infection  in blood, never had outbreak

## 2020-03-25 NOTE — OB PROVIDER TRIAGE NOTE - NSHPPHYSICALEXAM_GEN_ALL_CORE
Vital Signs  T(C): 37.1 (25 Mar 2020 12:50), Max: 37.1 (25 Mar 2020 12:36)  T(F): 98.78 (25 Mar 2020 12:50), Max: 98.8 (25 Mar 2020 12:36)  HR: 98 (25 Mar 2020 13:18) (98 - 113)  BP: 119/74 (25 Mar 2020 13:18) (119/74 - 130/82)  RR: 18 (25 Mar 2020 12:50) (18 - 18)    Abdomen gravid, soft and nontender  Continue EFM  SVE -1/50/-3 Intact  Cephalic presentation  GBS - negative  Clinical EFW - 3100G Vital Signs  T(C): 37.1 (25 Mar 2020 12:50), Max: 37.1 (25 Mar 2020 12:36)  T(F): 98.78 (25 Mar 2020 12:50), Max: 98.8 (25 Mar 2020 12:36)  HR: 98 (25 Mar 2020 13:18) (98 - 113)  BP: 119/74 (25 Mar 2020 13:18) (119/74 - 130/82)  RR: 18 (25 Mar 2020 12:50) (18 - 18)    Abdomen gravid, soft and nontender  Continue EFM  SVE -1/50/-3 Intact  SSE - neg for lesions  Cephalic presentation  GBS - negative  Clinical EFW - 3100G

## 2020-03-26 ENCOUNTER — APPOINTMENT (OUTPATIENT)
Dept: ANTEPARTUM | Facility: CLINIC | Age: 35
End: 2020-03-26

## 2020-03-26 ENCOUNTER — TRANSCRIPTION ENCOUNTER (OUTPATIENT)
Age: 35
End: 2020-03-26

## 2020-03-26 ENCOUNTER — APPOINTMENT (OUTPATIENT)
Dept: MATERNAL FETAL MEDICINE | Facility: CLINIC | Age: 35
End: 2020-03-26

## 2020-03-26 LAB
GLUCOSE BLDC GLUCOMTR-MCNC: 103 MG/DL — HIGH (ref 70–99)
GLUCOSE BLDC GLUCOMTR-MCNC: 104 MG/DL — HIGH (ref 70–99)
GLUCOSE BLDC GLUCOMTR-MCNC: 105 MG/DL — HIGH (ref 70–99)
GLUCOSE BLDC GLUCOMTR-MCNC: 106 MG/DL — HIGH (ref 70–99)
GLUCOSE BLDC GLUCOMTR-MCNC: 108 MG/DL — HIGH (ref 70–99)
GLUCOSE BLDC GLUCOMTR-MCNC: 109 MG/DL — HIGH (ref 70–99)
GLUCOSE BLDC GLUCOMTR-MCNC: 111 MG/DL — HIGH (ref 70–99)
GLUCOSE BLDC GLUCOMTR-MCNC: 112 MG/DL — HIGH (ref 70–99)
GLUCOSE BLDC GLUCOMTR-MCNC: 112 MG/DL — HIGH (ref 70–99)
GLUCOSE BLDC GLUCOMTR-MCNC: 113 MG/DL — HIGH (ref 70–99)
GLUCOSE BLDC GLUCOMTR-MCNC: 115 MG/DL — HIGH (ref 70–99)
GLUCOSE BLDC GLUCOMTR-MCNC: 117 MG/DL — HIGH (ref 70–99)
GLUCOSE BLDC GLUCOMTR-MCNC: 120 MG/DL — HIGH (ref 70–99)
GLUCOSE BLDC GLUCOMTR-MCNC: 121 MG/DL — HIGH (ref 70–99)
GLUCOSE BLDC GLUCOMTR-MCNC: 122 MG/DL — HIGH (ref 70–99)
GLUCOSE BLDC GLUCOMTR-MCNC: 126 MG/DL — HIGH (ref 70–99)
GLUCOSE BLDC GLUCOMTR-MCNC: 92 MG/DL — SIGNIFICANT CHANGE UP (ref 70–99)
GLUCOSE BLDC GLUCOMTR-MCNC: 94 MG/DL — SIGNIFICANT CHANGE UP (ref 70–99)
GLUCOSE BLDC GLUCOMTR-MCNC: 95 MG/DL — SIGNIFICANT CHANGE UP (ref 70–99)
GLUCOSE BLDC GLUCOMTR-MCNC: 96 MG/DL — SIGNIFICANT CHANGE UP (ref 70–99)
GLUCOSE BLDC GLUCOMTR-MCNC: 99 MG/DL — SIGNIFICANT CHANGE UP (ref 70–99)
T PALLIDUM AB TITR SER: NEGATIVE — SIGNIFICANT CHANGE UP

## 2020-03-26 RX ORDER — INSULIN HUMAN 100 [IU]/ML
2 INJECTION, SOLUTION SUBCUTANEOUS
Qty: 100 | Refills: 0 | Status: DISCONTINUED | OUTPATIENT
Start: 2020-03-26 | End: 2020-03-26

## 2020-03-26 RX ORDER — INSULIN HUMAN 100 [IU]/ML
2.5 INJECTION, SOLUTION SUBCUTANEOUS
Qty: 100 | Refills: 0 | Status: DISCONTINUED | OUTPATIENT
Start: 2020-03-26 | End: 2020-03-26

## 2020-03-26 RX ORDER — OXYTOCIN 10 UNIT/ML
2 VIAL (ML) INJECTION
Qty: 30 | Refills: 0 | Status: DISCONTINUED | OUTPATIENT
Start: 2020-03-26 | End: 2020-03-27

## 2020-03-26 RX ORDER — INSULIN HUMAN 100 [IU]/ML
2.5 INJECTION, SOLUTION SUBCUTANEOUS
Qty: 100 | Refills: 0 | Status: DISCONTINUED | OUTPATIENT
Start: 2020-03-26 | End: 2020-03-27

## 2020-03-26 RX ADMIN — Medication 2 MILLIUNIT(S)/MIN: at 20:25

## 2020-03-26 RX ADMIN — INSULIN HUMAN 2.5 UNIT(S)/HR: 100 INJECTION, SOLUTION SUBCUTANEOUS at 20:25

## 2020-03-26 RX ADMIN — SODIUM CHLORIDE 30 MILLILITER(S): 9 INJECTION INTRAMUSCULAR; INTRAVENOUS; SUBCUTANEOUS at 08:00

## 2020-03-26 RX ADMIN — INSULIN HUMAN 2.5 UNIT(S)/HR: 100 INJECTION, SOLUTION SUBCUTANEOUS at 06:03

## 2020-03-26 RX ADMIN — SODIUM CHLORIDE 30 MILLILITER(S): 9 INJECTION INTRAMUSCULAR; INTRAVENOUS; SUBCUTANEOUS at 20:26

## 2020-03-26 RX ADMIN — SODIUM CHLORIDE 125 MILLILITER(S): 9 INJECTION, SOLUTION INTRAVENOUS at 20:25

## 2020-03-26 RX ADMIN — INSULIN HUMAN 2 UNIT(S)/HR: 100 INJECTION, SOLUTION SUBCUTANEOUS at 02:06

## 2020-03-26 RX ADMIN — SODIUM CHLORIDE 125 MILLILITER(S): 9 INJECTION, SOLUTION INTRAVENOUS at 08:00

## 2020-03-26 RX ADMIN — INSULIN HUMAN 2.5 UNIT(S)/HR: 100 INJECTION, SOLUTION SUBCUTANEOUS at 08:00

## 2020-03-26 NOTE — CHART NOTE - NSCHARTNOTEFT_GEN_A_CORE
PGY1 Note       Examined patient at bedside for feeling contraction pain.       VS  T(C): 36.8 (03-25-20 @ 22:54)  HR: 85 (03-26-20 @ 00:57)  BP: 135/88 (03-26-20 @ 00:50)  RR: 18 (03-25-20 @ 15:01)  SpO2: 100% (03-26-20 @ 00:57)    SVE: 2.5/70/-3  FHR: 145/mod get/+acel/-decel: cat 1  [place]: ctx q 2-3 mins     IOL for DM-2  -Cervical balloon still in place  -Continue with PO Cytotec  -Call for epidural     d/w Dr. Dannie Carreon PGY1

## 2020-03-26 NOTE — CHART NOTE - NSCHARTNOTEFT_GEN_A_CORE
Patient assessed at bedside for cervical change.  Cervical balloon in vagina and removed.  Patient comfortable at this time.    SVE: 3/70/-3  EFM: 130, mod, +accels, -decels  [place]: irregular    Plan:  -continue with PO cytotec  -evaluate at time of next dose Patient assessed at bedside for cervical change.  Cervical balloon in vagina and removed.  Patient comfortable at this time.    SVE: 3/70/-3  EFM: 130, mod, +accels, -decels  [place]: irregular    Plan:  -continue with PO cytotec  -evaluate at time of next dose    d/w Dr. Dannie Roca, PGY1

## 2020-03-26 NOTE — CHART NOTE - NSCHARTNOTEFT_GEN_A_CORE
Patient assessed at bedside for cervical change.  PO cytotec course is finished.  Patient is comfortable at this time.    SVE: 4/70/-3  EFM: 140, mod, +accels, -decels  [place]: irregular    Plan:  -start Pit after signout at 7pm    d/w Dr. Dannie Roca, PGY1

## 2020-03-27 ENCOUNTER — TRANSCRIPTION ENCOUNTER (OUTPATIENT)
Age: 35
End: 2020-03-27

## 2020-03-27 LAB
ALBUMIN SERPL ELPH-MCNC: 2.9 G/DL — LOW (ref 3.3–5)
ALP SERPL-CCNC: 145 U/L — HIGH (ref 40–120)
ALT FLD-CCNC: 12 U/L — SIGNIFICANT CHANGE UP (ref 4–33)
ANION GAP SERPL CALC-SCNC: 11 MMO/L — SIGNIFICANT CHANGE UP (ref 7–14)
APTT BLD: 26.7 SEC — LOW (ref 27.5–36.3)
AST SERPL-CCNC: 17 U/L — SIGNIFICANT CHANGE UP (ref 4–32)
BASOPHILS # BLD AUTO: 0.02 K/UL — SIGNIFICANT CHANGE UP (ref 0–0.2)
BASOPHILS NFR BLD AUTO: 0.1 % — SIGNIFICANT CHANGE UP (ref 0–2)
BASOPHILS NFR SPEC: 0 % — SIGNIFICANT CHANGE UP (ref 0–2)
BILIRUB SERPL-MCNC: 0.7 MG/DL — SIGNIFICANT CHANGE UP (ref 0.2–1.2)
BLASTS # FLD: 0 % — SIGNIFICANT CHANGE UP (ref 0–0)
BUN SERPL-MCNC: 10 MG/DL — SIGNIFICANT CHANGE UP (ref 7–23)
CALCIUM SERPL-MCNC: 9.2 MG/DL — SIGNIFICANT CHANGE UP (ref 8.4–10.5)
CHLORIDE SERPL-SCNC: 104 MMOL/L — SIGNIFICANT CHANGE UP (ref 98–107)
CO2 SERPL-SCNC: 19 MMOL/L — LOW (ref 22–31)
CREAT SERPL-MCNC: 0.97 MG/DL — SIGNIFICANT CHANGE UP (ref 0.5–1.3)
EOSINOPHIL # BLD AUTO: 0 K/UL — SIGNIFICANT CHANGE UP (ref 0–0.5)
EOSINOPHIL NFR BLD AUTO: 0 % — SIGNIFICANT CHANGE UP (ref 0–6)
EOSINOPHIL NFR FLD: 0 % — SIGNIFICANT CHANGE UP (ref 0–6)
FIBRINOGEN PPP-MCNC: 805 MG/DL — HIGH (ref 300–520)
GIANT PLATELETS BLD QL SMEAR: PRESENT — SIGNIFICANT CHANGE UP
GLUCOSE BLDC GLUCOMTR-MCNC: 103 MG/DL — HIGH (ref 70–99)
GLUCOSE BLDC GLUCOMTR-MCNC: 105 MG/DL — HIGH (ref 70–99)
GLUCOSE BLDC GLUCOMTR-MCNC: 113 MG/DL — HIGH (ref 70–99)
GLUCOSE BLDC GLUCOMTR-MCNC: 118 MG/DL — HIGH (ref 70–99)
GLUCOSE BLDC GLUCOMTR-MCNC: 118 MG/DL — HIGH (ref 70–99)
GLUCOSE BLDC GLUCOMTR-MCNC: 120 MG/DL — HIGH (ref 70–99)
GLUCOSE BLDC GLUCOMTR-MCNC: 120 MG/DL — HIGH (ref 70–99)
GLUCOSE BLDC GLUCOMTR-MCNC: 122 MG/DL — HIGH (ref 70–99)
GLUCOSE BLDC GLUCOMTR-MCNC: 125 MG/DL — HIGH (ref 70–99)
GLUCOSE BLDC GLUCOMTR-MCNC: 159 MG/DL — HIGH (ref 70–99)
GLUCOSE SERPL-MCNC: 196 MG/DL — HIGH (ref 70–99)
HCT VFR BLD CALC: 34.1 % — LOW (ref 34.5–45)
HGB BLD-MCNC: 11.6 G/DL — SIGNIFICANT CHANGE UP (ref 11.5–15.5)
HYPOCHROMIA BLD QL: SLIGHT — SIGNIFICANT CHANGE UP
IMM GRANULOCYTES NFR BLD AUTO: 1 % — SIGNIFICANT CHANGE UP (ref 0–1.5)
INR BLD: 1.02 — SIGNIFICANT CHANGE UP (ref 0.88–1.17)
LDH SERPL L TO P-CCNC: 206 U/L — SIGNIFICANT CHANGE UP (ref 135–225)
LYMPHOCYTES # BLD AUTO: 0.76 K/UL — LOW (ref 1–3.3)
LYMPHOCYTES # BLD AUTO: 4.4 % — LOW (ref 13–44)
LYMPHOCYTES NFR SPEC AUTO: 4.4 % — LOW (ref 13–44)
MCHC RBC-ENTMCNC: 30.9 PG — SIGNIFICANT CHANGE UP (ref 27–34)
MCHC RBC-ENTMCNC: 34 % — SIGNIFICANT CHANGE UP (ref 32–36)
MCV RBC AUTO: 90.9 FL — SIGNIFICANT CHANGE UP (ref 80–100)
METAMYELOCYTES # FLD: 0 % — SIGNIFICANT CHANGE UP (ref 0–1)
MONOCYTES # BLD AUTO: 0.72 K/UL — SIGNIFICANT CHANGE UP (ref 0–0.9)
MONOCYTES NFR BLD AUTO: 4.1 % — SIGNIFICANT CHANGE UP (ref 2–14)
MONOCYTES NFR BLD: 0 % — LOW (ref 2–9)
MYELOCYTES NFR BLD: 0 % — SIGNIFICANT CHANGE UP (ref 0–0)
NEUTROPHIL AB SER-ACNC: 89.6 % — HIGH (ref 43–77)
NEUTROPHILS # BLD AUTO: 15.79 K/UL — HIGH (ref 1.8–7.4)
NEUTROPHILS NFR BLD AUTO: 90.4 % — HIGH (ref 43–77)
NEUTS BAND # BLD: 4.3 % — SIGNIFICANT CHANGE UP (ref 0–6)
NRBC # FLD: 0 K/UL — SIGNIFICANT CHANGE UP (ref 0–0)
OTHER - HEMATOLOGY %: 0 — SIGNIFICANT CHANGE UP
PLATELET # BLD AUTO: 221 K/UL — SIGNIFICANT CHANGE UP (ref 150–400)
PLATELET COUNT - ESTIMATE: NORMAL — SIGNIFICANT CHANGE UP
PMV BLD: 10.4 FL — SIGNIFICANT CHANGE UP (ref 7–13)
POLYCHROMASIA BLD QL SMEAR: SLIGHT — SIGNIFICANT CHANGE UP
POTASSIUM SERPL-MCNC: 4.2 MMOL/L — SIGNIFICANT CHANGE UP (ref 3.5–5.3)
POTASSIUM SERPL-SCNC: 4.2 MMOL/L — SIGNIFICANT CHANGE UP (ref 3.5–5.3)
PROMYELOCYTES # FLD: 0 % — SIGNIFICANT CHANGE UP (ref 0–0)
PROT SERPL-MCNC: 6.8 G/DL — SIGNIFICANT CHANGE UP (ref 6–8.3)
PROTHROM AB SERPL-ACNC: 11.6 SEC — SIGNIFICANT CHANGE UP (ref 9.8–13.1)
RBC # BLD: 3.75 M/UL — LOW (ref 3.8–5.2)
RBC # FLD: 14.6 % — HIGH (ref 10.3–14.5)
SODIUM SERPL-SCNC: 134 MMOL/L — LOW (ref 135–145)
URATE SERPL-MCNC: 3.5 MG/DL — SIGNIFICANT CHANGE UP (ref 2.5–7)
VARIANT LYMPHS # BLD: 1.7 % — SIGNIFICANT CHANGE UP
WBC # BLD: 17.46 K/UL — HIGH (ref 3.8–10.5)
WBC # FLD AUTO: 17.46 K/UL — HIGH (ref 3.8–10.5)

## 2020-03-27 RX ORDER — HYDROMORPHONE HYDROCHLORIDE 2 MG/ML
1 INJECTION INTRAMUSCULAR; INTRAVENOUS; SUBCUTANEOUS
Refills: 0 | Status: DISCONTINUED | OUTPATIENT
Start: 2020-03-27 | End: 2020-03-27

## 2020-03-27 RX ORDER — DEXTROSE 50 % IN WATER 50 %
25 SYRINGE (ML) INTRAVENOUS ONCE
Refills: 0 | Status: DISCONTINUED | OUTPATIENT
Start: 2020-03-27 | End: 2020-03-29

## 2020-03-27 RX ORDER — SIMETHICONE 80 MG/1
80 TABLET, CHEWABLE ORAL EVERY 4 HOURS
Refills: 0 | Status: DISCONTINUED | OUTPATIENT
Start: 2020-03-27 | End: 2020-03-29

## 2020-03-27 RX ORDER — MAGNESIUM HYDROXIDE 400 MG/1
30 TABLET, CHEWABLE ORAL
Refills: 0 | Status: DISCONTINUED | OUTPATIENT
Start: 2020-03-27 | End: 2020-03-29

## 2020-03-27 RX ORDER — GLYCERIN ADULT
1 SUPPOSITORY, RECTAL RECTAL AT BEDTIME
Refills: 0 | Status: DISCONTINUED | OUTPATIENT
Start: 2020-03-27 | End: 2020-03-29

## 2020-03-27 RX ORDER — OXYCODONE HYDROCHLORIDE 5 MG/1
5 TABLET ORAL
Refills: 0 | Status: DISCONTINUED | OUTPATIENT
Start: 2020-03-27 | End: 2020-03-29

## 2020-03-27 RX ORDER — INSULIN LISPRO 100/ML
VIAL (ML) SUBCUTANEOUS AT BEDTIME
Refills: 0 | Status: DISCONTINUED | OUTPATIENT
Start: 2020-03-27 | End: 2020-03-29

## 2020-03-27 RX ORDER — FERROUS SULFATE 325(65) MG
325 TABLET ORAL DAILY
Refills: 0 | Status: DISCONTINUED | OUTPATIENT
Start: 2020-03-27 | End: 2020-03-29

## 2020-03-27 RX ORDER — SODIUM CHLORIDE 9 MG/ML
1000 INJECTION, SOLUTION INTRAVENOUS
Refills: 0 | Status: DISCONTINUED | OUTPATIENT
Start: 2020-03-27 | End: 2020-03-28

## 2020-03-27 RX ORDER — DEXTROSE 50 % IN WATER 50 %
12.5 SYRINGE (ML) INTRAVENOUS ONCE
Refills: 0 | Status: DISCONTINUED | OUTPATIENT
Start: 2020-03-27 | End: 2020-03-29

## 2020-03-27 RX ORDER — LANOLIN
1 OINTMENT (GRAM) TOPICAL EVERY 6 HOURS
Refills: 0 | Status: DISCONTINUED | OUTPATIENT
Start: 2020-03-27 | End: 2020-03-29

## 2020-03-27 RX ORDER — CITRIC ACID/SODIUM CITRATE 300-500 MG
30 SOLUTION, ORAL ORAL ONCE
Refills: 0 | Status: COMPLETED | OUTPATIENT
Start: 2020-03-27 | End: 2020-03-27

## 2020-03-27 RX ORDER — KETOROLAC TROMETHAMINE 30 MG/ML
30 SYRINGE (ML) INJECTION EVERY 6 HOURS
Refills: 0 | Status: DISCONTINUED | OUTPATIENT
Start: 2020-03-27 | End: 2020-03-28

## 2020-03-27 RX ORDER — SODIUM CHLORIDE 9 MG/ML
1000 INJECTION, SOLUTION INTRAVENOUS ONCE
Refills: 0 | Status: DISCONTINUED | OUTPATIENT
Start: 2020-03-27 | End: 2020-03-29

## 2020-03-27 RX ORDER — HEPARIN SODIUM 5000 [USP'U]/ML
5000 INJECTION INTRAVENOUS; SUBCUTANEOUS EVERY 12 HOURS
Refills: 0 | Status: DISCONTINUED | OUTPATIENT
Start: 2020-03-27 | End: 2020-03-29

## 2020-03-27 RX ORDER — GLUCAGON INJECTION, SOLUTION 0.5 MG/.1ML
1 INJECTION, SOLUTION SUBCUTANEOUS ONCE
Refills: 0 | Status: DISCONTINUED | OUTPATIENT
Start: 2020-03-27 | End: 2020-03-29

## 2020-03-27 RX ORDER — SODIUM CHLORIDE 9 MG/ML
1000 INJECTION, SOLUTION INTRAVENOUS
Refills: 0 | Status: DISCONTINUED | OUTPATIENT
Start: 2020-03-27 | End: 2020-03-29

## 2020-03-27 RX ORDER — AZITHROMYCIN 500 MG/1
500 TABLET, FILM COATED ORAL ONCE
Refills: 0 | Status: DISCONTINUED | OUTPATIENT
Start: 2020-03-27 | End: 2020-03-27

## 2020-03-27 RX ORDER — OXYCODONE HYDROCHLORIDE 5 MG/1
5 TABLET ORAL ONCE
Refills: 0 | Status: DISCONTINUED | OUTPATIENT
Start: 2020-03-27 | End: 2020-03-29

## 2020-03-27 RX ORDER — DEXTROSE 50 % IN WATER 50 %
15 SYRINGE (ML) INTRAVENOUS ONCE
Refills: 0 | Status: DISCONTINUED | OUTPATIENT
Start: 2020-03-27 | End: 2020-03-29

## 2020-03-27 RX ORDER — IBUPROFEN 200 MG
600 TABLET ORAL EVERY 6 HOURS
Refills: 0 | Status: COMPLETED | OUTPATIENT
Start: 2020-03-27 | End: 2021-02-23

## 2020-03-27 RX ORDER — ACETAMINOPHEN 500 MG
975 TABLET ORAL
Refills: 0 | Status: DISCONTINUED | OUTPATIENT
Start: 2020-03-27 | End: 2020-03-29

## 2020-03-27 RX ORDER — INSULIN LISPRO 100/ML
VIAL (ML) SUBCUTANEOUS
Refills: 0 | Status: DISCONTINUED | OUTPATIENT
Start: 2020-03-27 | End: 2020-03-29

## 2020-03-27 RX ORDER — DIPHENHYDRAMINE HCL 50 MG
25 CAPSULE ORAL EVERY 6 HOURS
Refills: 0 | Status: DISCONTINUED | OUTPATIENT
Start: 2020-03-27 | End: 2020-03-29

## 2020-03-27 RX ORDER — FOLIC ACID 0.8 MG
1 TABLET ORAL DAILY
Refills: 0 | Status: DISCONTINUED | OUTPATIENT
Start: 2020-03-27 | End: 2020-03-29

## 2020-03-27 RX ORDER — METOCLOPRAMIDE HCL 10 MG
10 TABLET ORAL ONCE
Refills: 0 | Status: COMPLETED | OUTPATIENT
Start: 2020-03-27 | End: 2020-03-27

## 2020-03-27 RX ORDER — TETANUS TOXOID, REDUCED DIPHTHERIA TOXOID AND ACELLULAR PERTUSSIS VACCINE, ADSORBED 5; 2.5; 8; 8; 2.5 [IU]/.5ML; [IU]/.5ML; UG/.5ML; UG/.5ML; UG/.5ML
0.5 SUSPENSION INTRAMUSCULAR ONCE
Refills: 0 | Status: DISCONTINUED | OUTPATIENT
Start: 2020-03-27 | End: 2020-03-29

## 2020-03-27 RX ORDER — OXYTOCIN 10 UNIT/ML
333.33 VIAL (ML) INJECTION
Qty: 20 | Refills: 0 | Status: DISCONTINUED | OUTPATIENT
Start: 2020-03-27 | End: 2020-03-29

## 2020-03-27 RX ORDER — FAMOTIDINE 10 MG/ML
20 INJECTION INTRAVENOUS ONCE
Refills: 0 | Status: COMPLETED | OUTPATIENT
Start: 2020-03-27 | End: 2020-03-27

## 2020-03-27 RX ADMIN — FAMOTIDINE 20 MILLIGRAM(S): 10 INJECTION INTRAVENOUS at 07:53

## 2020-03-27 RX ADMIN — Medication 30 MILLILITER(S): at 07:53

## 2020-03-27 RX ADMIN — Medication 10 MILLIGRAM(S): at 07:53

## 2020-03-27 RX ADMIN — HEPARIN SODIUM 5000 UNIT(S): 5000 INJECTION INTRAVENOUS; SUBCUTANEOUS at 16:15

## 2020-03-27 RX ADMIN — Medication 30 MILLIGRAM(S): at 15:16

## 2020-03-27 RX ADMIN — HYDROMORPHONE HYDROCHLORIDE 1 MILLIGRAM(S): 2 INJECTION INTRAMUSCULAR; INTRAVENOUS; SUBCUTANEOUS at 12:55

## 2020-03-27 RX ADMIN — Medication 1000 MILLIUNIT(S)/MIN: at 15:16

## 2020-03-27 RX ADMIN — HYDROMORPHONE HYDROCHLORIDE 1 MILLIGRAM(S): 2 INJECTION INTRAMUSCULAR; INTRAVENOUS; SUBCUTANEOUS at 15:12

## 2020-03-27 NOTE — CHART NOTE - NSCHARTNOTEFT_GEN_A_CORE
Patient is 35y  s/p CS  noted with labile BP  denied any hx of PEC and denied any s/s of PEC       Vital Signs Last 24 Hrs  T(C): 36.8 (27 Mar 2020 09:35), Max: 37.0 (26 Mar 2020 19:59)  T(F): 98.2 (27 Mar 2020 09:35), Max: 98.6 (26 Mar 2020 19:59)  HR: 80 (27 Mar 2020 12:00) (80 - 113)  BP: 132/73 (27 Mar 2020 12:00) (80/39 - 219/136)  BP(mean): 86 (27 Mar 2020 12:00) (78 - 99)  RR: 13 (27 Mar 2020 12:00) (12 - 25)  SpO2: 96% (27 Mar 2020 12:00) (72% - 100%)    I&O's Detail    26 Mar 2020 07:01  -  27 Mar 2020 07:00  --------------------------------------------------------  IN:    dextrose 5% + lactated ringers.: 1500 mL    insulin regular Infusion: 29 mL    oxytocin Infusion: 82 mL    sodium chloride 0.9%: 360 mL  Total IN: 1971 mL    OUT:    Indwelling Catheter - Urethral: 1400 mL  Total OUT: 1400 mL    Total NET: 571 mL      27 Mar 2020 07:01  -  27 Mar 2020 12:52  --------------------------------------------------------  IN:    dextrose 5% + lactated ringers.: 125 mL    lactated ringers.: 300 mL    Other: 2200 mL    oxytocin Infusion: 625 mL    sodium chloride 0.9%: 30 mL  Total IN: 3280 mL    OUT:    Estimated Blood Loss: 473 mL    Indwelling Catheter - Urethral: 460 mL    Other: 200 mL  Total OUT: 1133 mL    Total NET: 2147 mL          Labs:                        11.6   17.46 )-----------( 221      ( 27 Mar 2020 10:49 )             34.1                         12.2   11.55 )-----------( 256      ( 25 Mar 2020 14:50 )             36.7         PT/INR - ( 27 Mar 2020 10:49 )   PT: 11.6 SEC;   INR: 1.02          PTT - ( 27 Mar 2020 10:49 )  PTT:26.7 SEC    Fibrinogen: Fibrinogen Assay: 805.0 mg/dL (03-27 @ 10:49)      Lactate:     MEDICATIONS  (STANDING):  acetaminophen   Tablet .. 975 milliGRAM(s) Oral <User Schedule>  dextrose 5%. 1000 milliLiter(s) (50 mL/Hr) IV Continuous <Continuous>  dextrose 50% Injectable 12.5 Gram(s) IV Push once  dextrose 50% Injectable 25 Gram(s) IV Push once  dextrose 50% Injectable 25 Gram(s) IV Push once  diphtheria/tetanus/pertussis (acellular) Vaccine (ADAcel) 0.5 milliLiter(s) IntraMuscular once  ferrous    sulfate 325 milliGRAM(s) Oral daily  folic acid 1 milliGRAM(s) Oral daily  heparin  Injectable 5000 Unit(s) SubCutaneous every 12 hours  ibuprofen  Tablet. 600 milliGRAM(s) Oral every 6 hours  insulin lispro (HumaLOG) corrective regimen sliding scale   SubCutaneous three times a day before meals  insulin lispro (HumaLOG) corrective regimen sliding scale   SubCutaneous at bedtime  ketorolac   Injectable 30 milliGRAM(s) IV Push every 6 hours  lactated ringers. 1000 milliLiter(s) (125 mL/Hr) IV Continuous <Continuous>  oxytocin Infusion 333.333 milliUNIT(s)/Min (1000 mL/Hr) IV Continuous <Continuous>  oxytocin Infusion 333.333 milliUNIT(s)/Min (1000 mL/Hr) IV Continuous <Continuous>  prenatal multivitamin 1 Tablet(s) Oral daily      Evaluation :  pt seen    in NAD    lungs clear   heart  S1 S2   abd soft dressing intact      Differential Dx  r/o PEC     Management and Follow-up plan :  HEL labs     continued BP monitoring     Jayant BAKER               -------------------------------------------------------------------------------------------------------------

## 2020-03-27 NOTE — DISCHARGE NOTE OB - MEDICATION SUMMARY - MEDICATIONS TO STOP TAKING
I will STOP taking the medications listed below when I get home from the hospital:    insulin  -- nph 16 u am  nph 90 u qhs

## 2020-03-27 NOTE — CHART NOTE - NSCHARTNOTEFT_GEN_A_CORE
Patient evaluated bedside for labor progression. No membranes palpated. IUPC placed.    T(C): 36.8 (23:57)  HR: 96 (00:52)  BP: 115/63 (00:36)  RR: 16 (23:57)  SpO2: 100% (00:57)    SVE: 4-5/90/-2  EFM: CAT II FHT, 145BPM, 2x late decelerations otherwise reassuring with moderate variability, +accels  [place]:  CTX q1-3min    A/P: Patient is a 34yo  at 40-1 DMII IOL on insulin gtt, s/p PO/CB. Pitocin started @8p. Contractions likely inadequate given lack of cervical change, will continue to monitor contraction pattern with IUPC and increase pitocin accordingly to achieve adequate contractions.    Claribel Cronin, PGY2  D/W Dr. Pandey

## 2020-03-27 NOTE — DISCHARGE NOTE OB - CARE PLAN
Principal Discharge DX:	 delivery delivered  Goal:	Normal postoperative course  Assessment and plan of treatment:	nothing in vagina  Secondary Diagnosis:	Admitted to labor and delivery  Secondary Diagnosis:	Diabetes mellitus affecting pregnancy in third trimester Principal Discharge DX:	 delivery delivered  Goal:	Normal postoperative course  Assessment and plan of treatment:	Return to your regular way of eating. Resume normal activity as tolerated, however No heavy lifting or strenuous activity for 6 weeks.  No driving for next 2 weeks and/or while on narcotic pain medication.  Complete vaginal rest, no tampons, no douching, no tub bathing, no sexual activities for 6 weeks unless otherwise instructed by your doctor. Call your doctor with any signs and symptoms of infection such as fever, chills, nausea or vomiting.  Call your doctor with redness or swelling at the incision site, fluid leakage or wound separation.  Call your doctor if you're unable to tolerate food or have difficulty urinating.  Call your doctor if you have pain that is not relieved by your prescribed medications.  Notify your doctor with any other concerns.  Secondary Diagnosis:	Admitted to labor and delivery  Secondary Diagnosis:	Diabetes mellitus affecting pregnancy in third trimester

## 2020-03-27 NOTE — DISCHARGE NOTE OB - PLAN OF CARE
Normal postoperative course nothing in vagina Return to your regular way of eating. Resume normal activity as tolerated, however No heavy lifting or strenuous activity for 6 weeks.  No driving for next 2 weeks and/or while on narcotic pain medication.  Complete vaginal rest, no tampons, no douching, no tub bathing, no sexual activities for 6 weeks unless otherwise instructed by your doctor. Call your doctor with any signs and symptoms of infection such as fever, chills, nausea or vomiting.  Call your doctor with redness or swelling at the incision site, fluid leakage or wound separation.  Call your doctor if you're unable to tolerate food or have difficulty urinating.  Call your doctor if you have pain that is not relieved by your prescribed medications.  Notify your doctor with any other concerns.

## 2020-03-27 NOTE — DISCHARGE NOTE OB - PATIENT PORTAL LINK FT
You can access the FollowMyHealth Patient Portal offered by [facility] by registering at the following website: [email or web address]. By joining [facility]’s FollowMyHealth portal, you will also be able to view your health information using other applications (apps) compatible with our system.

## 2020-03-27 NOTE — OB NEONATOLOGY/PEDIATRICIAN DELIVERY SUMMARY - NSPEDSNEONOTESA_OBGYN_ALL_OB_FT
This is a 40.2 female infant born to a 34 YO , maternal blood type O+, GBS neg, HIV neg. RI, HEP B neg., + HSV type I &II IGM-lab performed on 10/1/19. Medical history significant for Type I pregestational diabetes on insulin, and elevated BMI,  ROM 8 hours with a maternal temp,. 37.0.  EOS 0.13.  Infant born via primary c/s for cat II tracing and arrest of labor.  Infant had general cyanosis with a weak cry, NEOPUFF 6 given with FiO2=40% x 3 min with an improvement in color and resp effort.  Transfer to [facility]

## 2020-03-27 NOTE — DISCHARGE NOTE OB - ADDITIONAL INSTRUCTIONS
follow up with Dr. Pandey in one week.  Call the office for an appointment  Then follow up in 2 weeks for wound check.  Call the office for fever >100.4 or for any excessive vaginal bleeding or foul discharge  Call the office if breast become excessively red or discharge from nipple

## 2020-03-27 NOTE — DISCHARGE NOTE OB - CARE PROVIDER_API CALL
Dipti Pandey)  Obstetrics and Gynecology  [address] [place] [address]  Phone: [phone]  Fax: [phone]  Established Patient  Follow Up Time: 1 week

## 2020-03-27 NOTE — DISCHARGE NOTE OB - MEDICATION SUMMARY - MEDICATIONS TO TAKE
I will START or STAY ON the medications listed below when I get home from the hospital:    acetaminophen 325 mg oral tablet  -- 3 tab(s) by mouth every 6 hours, As Needed  -- Indication: For mild pain managment    ibuprofen 600 mg oral tablet  -- 1 tab(s) by mouth every 6 hours, As Needed  -- Indication: For moderate to severe pain management    metFORMIN 500 mg oral tablet, extended release  -- 1 tab(s) by mouth once a day  -- Indication: For Diabetes    lanolin topical ointment  -- 1 application on skin every 6 hours, As needed, Sore Nipples  -- Indication: For breast care    ferrous sulfate 325 mg (65 mg elemental iron) oral tablet  -- 1 tab(s) by mouth 2 times a day  -- Indication: For Anemia    Prena1 oral capsule  -- 1 cap(s) by mouth once a day  -- Indication: For vitamin    simethicone 80 mg oral tablet, chewable  -- 1 tab(s) by mouth every 4 hours, As needed, Gas  -- Indication: For gas relief    folic acid 1 mg oral tablet  -- 1 tab(s) by mouth once a day  -- Indication: For Anemia

## 2020-03-27 NOTE — CHART NOTE - NSCHARTNOTEFT_GEN_A_CORE
OB Attending  Pt comfortable with contractions.    Currently with Pitocin running and has epidural catheter in place.  On exam cervix 8 cm however station remains unchanged at -3.  Tracing- occasional decelerations however overall reassuring.  After discussion with pt , decision made to proceed to abdominal delivery due to failure of descent  Pt qiven opportunity to ask questions and her questions were answered to her satisfaction  Consents signed  Anesthesia aware.  Gerald

## 2020-03-27 NOTE — OB PROVIDER DELIVERY SUMMARY - NSPROVIDERDELIVERYNOTE_OBGYN_ALL_OB_FT
Delivered via lst CS liveborn female infant apgar 8/9. Delayed cord clamping performed.  Segment sent for gas.  Placenta delivered intact.  Uterine atony corrected with Methergine x 1. Hysterotomy closed in 2 layers. Abdominal layers reapproximated in usual fashion. Steristrips applied.  Mother and baby stable.. ivf-2200, qbl-473cc, urine-200cc.

## 2020-03-27 NOTE — DISCHARGE NOTE OB - MATERIALS PROVIDED
[facility] Hearing Screen Program/Discharge Medication Information for Patients and Families Pocket Guide/Breastfeeding Log/[place] Feeding Log/Breastfeeding Guide and Packet/Shaken Baby Prevention Handout/Birth Certificate Instructions/Vaccinations/Guide to Postpartum Care/Tdap Vaccination (VIS Pub Date: 2012)/[facility] [place] Screening Program/[place]  Immunization Record/MMR Vaccination (VIS Pub Date: 2012)

## 2020-03-27 NOTE — OB RN DELIVERY SUMMARY - NS_SEPSISRSKCALC_OBGYN_ALL_OB_FT
EOS calculated successfully. EOS Risk Factor: 0.5/1000 live births ([facility] national incidence); GA=40w2d; Temp=98.8; ROM=7.8; GBS='Negative'; Antibiotics='No antibiotics or any antibiotics < 2 hrs prior to birth'

## 2020-03-28 LAB
BASOPHILS # BLD AUTO: 0.02 K/UL — SIGNIFICANT CHANGE UP (ref 0–0.2)
BASOPHILS NFR BLD AUTO: 0.1 % — SIGNIFICANT CHANGE UP (ref 0–2)
EOSINOPHIL # BLD AUTO: 0.01 K/UL — SIGNIFICANT CHANGE UP (ref 0–0.5)
EOSINOPHIL NFR BLD AUTO: 0.1 % — SIGNIFICANT CHANGE UP (ref 0–6)
GLUCOSE BLDC GLUCOMTR-MCNC: 113 MG/DL — HIGH (ref 70–99)
GLUCOSE BLDC GLUCOMTR-MCNC: 114 MG/DL — HIGH (ref 70–99)
GLUCOSE BLDC GLUCOMTR-MCNC: 95 MG/DL — SIGNIFICANT CHANGE UP (ref 70–99)
HBA1C BLD-MCNC: 6.3 % — HIGH (ref 4–5.6)
HCT VFR BLD CALC: 28.3 % — LOW (ref 34.5–45)
HGB BLD-MCNC: 9.5 G/DL — LOW (ref 11.5–15.5)
IMM GRANULOCYTES NFR BLD AUTO: 0.7 % — SIGNIFICANT CHANGE UP (ref 0–1.5)
LYMPHOCYTES # BLD AUTO: 13.8 % — SIGNIFICANT CHANGE UP (ref 13–44)
LYMPHOCYTES # BLD AUTO: 2.49 K/UL — SIGNIFICANT CHANGE UP (ref 1–3.3)
MCHC RBC-ENTMCNC: 31.4 PG — SIGNIFICANT CHANGE UP (ref 27–34)
MCHC RBC-ENTMCNC: 33.6 % — SIGNIFICANT CHANGE UP (ref 32–36)
MCV RBC AUTO: 93.4 FL — SIGNIFICANT CHANGE UP (ref 80–100)
MONOCYTES # BLD AUTO: 1.26 K/UL — HIGH (ref 0–0.9)
MONOCYTES NFR BLD AUTO: 7 % — SIGNIFICANT CHANGE UP (ref 2–14)
NEUTROPHILS # BLD AUTO: 14.13 K/UL — HIGH (ref 1.8–7.4)
NEUTROPHILS NFR BLD AUTO: 78.3 % — HIGH (ref 43–77)
NRBC # FLD: 0 K/UL — SIGNIFICANT CHANGE UP (ref 0–0)
PLATELET # BLD AUTO: 205 K/UL — SIGNIFICANT CHANGE UP (ref 150–400)
PMV BLD: 10.8 FL — SIGNIFICANT CHANGE UP (ref 7–13)
RBC # BLD: 3.03 M/UL — LOW (ref 3.8–5.2)
RBC # FLD: 14.6 % — HIGH (ref 10.3–14.5)
WBC # BLD: 18.04 K/UL — HIGH (ref 3.8–10.5)
WBC # FLD AUTO: 18.04 K/UL — HIGH (ref 3.8–10.5)

## 2020-03-28 RX ORDER — IBUPROFEN 200 MG
600 TABLET ORAL EVERY 6 HOURS
Refills: 0 | Status: DISCONTINUED | OUTPATIENT
Start: 2020-03-28 | End: 2020-03-29

## 2020-03-28 RX ADMIN — Medication 325 MILLIGRAM(S): at 14:07

## 2020-03-28 RX ADMIN — Medication 1 TABLET(S): at 14:06

## 2020-03-28 RX ADMIN — Medication 600 MILLIGRAM(S): at 23:47

## 2020-03-28 RX ADMIN — Medication 30 MILLIGRAM(S): at 07:12

## 2020-03-28 RX ADMIN — Medication 975 MILLIGRAM(S): at 15:00

## 2020-03-28 RX ADMIN — HEPARIN SODIUM 5000 UNIT(S): 5000 INJECTION INTRAVENOUS; SUBCUTANEOUS at 17:55

## 2020-03-28 RX ADMIN — Medication 30 MILLIGRAM(S): at 05:54

## 2020-03-28 RX ADMIN — Medication 30 MILLIGRAM(S): at 06:33

## 2020-03-28 RX ADMIN — Medication 1 MILLIGRAM(S): at 14:41

## 2020-03-28 RX ADMIN — HEPARIN SODIUM 5000 UNIT(S): 5000 INJECTION INTRAVENOUS; SUBCUTANEOUS at 05:55

## 2020-03-28 RX ADMIN — Medication 975 MILLIGRAM(S): at 14:23

## 2020-03-28 RX ADMIN — Medication 975 MILLIGRAM(S): at 21:39

## 2020-03-28 RX ADMIN — Medication 975 MILLIGRAM(S): at 21:07

## 2020-03-28 NOTE — CHART NOTE - NSCHARTNOTEFT_GEN_A_CORE
Endocrine consulted for Type 2 DM. During pregnancy on NPH 16 units in AM and 90 units in PM. A1C now 6.3%. As per outpatient records, A1C 6.3 prior to pregnancy as well. Unable to reach pt on phone. BG post-partum in good range on HISS. Based on current FS, would start metformin 500mg ER q daily for now. ( did not tolerate regular release metformin in the past as per outpatient records). If BG persistently >160, then can increase to 500mg ER BID. Patient would need repeat A1C and OGTT in 6 weeks as outpatient.     Patient can f/u with endocrinology at [address] [place] [address] [phone]    If further questions, can call endocrine at [phone]

## 2020-03-28 NOTE — PROGRESS NOTE ADULT - SUBJECTIVE AND OBJECTIVE BOX
Postpartum Note,  Section  She is a  35y woman who is now post-operative day: 1.  Currently in  PACU (24h stay) due to concerns for BP's urine output and 2 sats. yesterday  Today pt feels well.  Has ambulated to bathroom. Fingerstick glucose -no coverage required. Baby in NICU on CPAP    Subjective:  The patient feels well.  She is ambulating.   She is tolerating regular diet.  She denies nausea and vomiting.  She is voiding.  Her pain is controlled.  She reports normal postpartum bleeding.  She is formula feeding.    Physical exam:    Vital Signs Last 24 Hrs  T(C): 36.7 (28 Mar 2020 05:58), Max: 37.2 (27 Mar 2020 22:30)  T(F): 98.1 (28 Mar 2020 05:58), Max: 99 (27 Mar 2020 22:30)  HR: 81 (27 Mar 2020 13:30) (80 - 111)  BP: 116/59 (28 Mar 2020 05:59) (99/56 - 219/136)  BP(mean): 99 (28 Mar 2020 05:59) (77 - 99)  RR: 22 (27 Mar 2020 12:45) (13 - 25)  SpO2: 98% (28 Mar 2020 05:59) (88% - 100%)    Gen: NAD  Breast: Soft, nontender, not engorged.  Abdomen: Soft, nontender, no distension , firm uterine fundus at umbilicus.  Incision: Clean, dry, and intact with steri strips  Pelvic: Normal lochia noted  Ext: No calf tenderness, venodynes in place    LABS:                        11.6   17.46 )-----------( 221      ( 27 Mar 2020 10:49 )             34.1       Rubella status:     Allergies    No Known Allergies    Intolerances      MEDICATIONS  (STANDING):  acetaminophen   Tablet .. 975 milliGRAM(s) Oral <User Schedule>  dextrose 5%. 1000 milliLiter(s) (50 mL/Hr) IV Continuous <Continuous>  dextrose 50% Injectable 12.5 Gram(s) IV Push once  dextrose 50% Injectable 25 Gram(s) IV Push once  dextrose 50% Injectable 25 Gram(s) IV Push once  diphtheria/tetanus/pertussis (acellular) Vaccine (ADAcel) 0.5 milliLiter(s) IntraMuscular once  ferrous    sulfate 325 milliGRAM(s) Oral daily  folic acid 1 milliGRAM(s) Oral daily  heparin  Injectable 5000 Unit(s) SubCutaneous every 12 hours  ibuprofen  Tablet. 600 milliGRAM(s) Oral every 6 hours  insulin lispro (HumaLOG) corrective regimen sliding scale   SubCutaneous three times a day before meals  insulin lispro (HumaLOG) corrective regimen sliding scale   SubCutaneous at bedtime  ketorolac   Injectable 30 milliGRAM(s) IV Push every 6 hours  lactated ringers Bolus 1000 milliLiter(s) IV Bolus once  oxytocin Infusion 333.333 milliUNIT(s)/Min (1000 mL/Hr) IV Continuous <Continuous>  prenatal multivitamin 1 Tablet(s) Oral daily    MEDICATIONS  (PRN):  dextrose 40% Gel 15 Gram(s) Oral once PRN Blood Glucose LESS THAN 70 milliGRAM(s)/deciliter  diphenhydrAMINE 25 milliGRAM(s) Oral every 6 hours PRN Itching  glucagon  Injectable 1 milliGRAM(s) IntraMuscular once PRN Glucose LESS THAN 70 milligrams/deciliter  glycerin Suppository - Adult 1 Suppository(s) Rectal at bedtime PRN Constipation  HYDROmorphone  Injectable 1 milliGRAM(s) IV Push every 3 hours PRN Severe Pain (7 - 10)  lanolin Ointment 1 Application(s) Topical every 6 hours PRN Sore Nipples  magnesium hydroxide Suspension 30 milliLiter(s) Oral two times a day PRN Constipation  oxyCODONE    IR 5 milliGRAM(s) Oral every 3 hours PRN Moderate to Severe Pain (4-10)  oxyCODONE    IR 5 milliGRAM(s) Oral once PRN Moderate to Severe Pain (4-10)  simethicone 80 milliGRAM(s) Chew every 4 hours PRN Gas        Assessment and Plan  POD #1 s/p  section  Doing well.  Encourage ambulation.  f/u am cbc  Endocrine consult re diabetes  Continue routine post op care.

## 2020-03-29 VITALS — TEMPERATURE: 98 F | HEART RATE: 106 BPM | SYSTOLIC BLOOD PRESSURE: 113 MMHG | DIASTOLIC BLOOD PRESSURE: 63 MMHG

## 2020-03-29 LAB
GLUCOSE BLDC GLUCOMTR-MCNC: 116 MG/DL — HIGH (ref 70–99)
GLUCOSE BLDC GLUCOMTR-MCNC: 134 MG/DL — HIGH (ref 70–99)
GLUCOSE BLDC GLUCOMTR-MCNC: 88 MG/DL — SIGNIFICANT CHANGE UP (ref 70–99)

## 2020-03-29 RX ORDER — METFORMIN HYDROCHLORIDE 850 MG/1
1 TABLET ORAL
Qty: 0 | Refills: 0 | DISCHARGE

## 2020-03-29 RX ORDER — SIMETHICONE 80 MG/1
1 TABLET, CHEWABLE ORAL
Qty: 0 | Refills: 0 | DISCHARGE
Start: 2020-03-29

## 2020-03-29 RX ORDER — LANOLIN
1 OINTMENT (GRAM) TOPICAL
Qty: 0 | Refills: 0 | DISCHARGE
Start: 2020-03-29

## 2020-03-29 RX ORDER — FOLIC ACID 0.8 MG
1 TABLET ORAL
Qty: 0 | Refills: 0 | DISCHARGE
Start: 2020-03-29

## 2020-03-29 RX ORDER — FERROUS SULFATE 325(65) MG
1 TABLET ORAL
Qty: 0 | Refills: 0 | DISCHARGE
Start: 2020-03-29

## 2020-03-29 RX ORDER — ACETAMINOPHEN 500 MG
3 TABLET ORAL
Qty: 0 | Refills: 0 | DISCHARGE
Start: 2020-03-29

## 2020-03-29 RX ORDER — IBUPROFEN 200 MG
1 TABLET ORAL
Qty: 0 | Refills: 0 | DISCHARGE
Start: 2020-03-29

## 2020-03-29 RX ADMIN — Medication 600 MILLIGRAM(S): at 00:15

## 2020-03-29 RX ADMIN — Medication 600 MILLIGRAM(S): at 06:16

## 2020-03-29 RX ADMIN — Medication 600 MILLIGRAM(S): at 06:47

## 2020-03-29 RX ADMIN — HEPARIN SODIUM 5000 UNIT(S): 5000 INJECTION INTRAVENOUS; SUBCUTANEOUS at 06:16

## 2023-07-28 NOTE — OB RN DELIVERY SUMMARY - NS_DELIVERYROOM_OBGYN_ALL_OB_FT
7-27-Cm note: Pt om room air, awaiting blood and urine cx's to result, pt on IV Abx's , pt independent , no DME , cm following for possible IV Abx's at dc, pt will need a line, if no IV's at dc pt has no needs , he drove here and plans on driving self home.  Electronically signed by Kisha Barbosa RN on 7/27/2023 at 11:18 AM
7-28-Cm note: pt still getting IV Abx's , per ID note probable transition to oral abx's at dc, pt denies any needs for home. Pt drove himself here.  Cm following Electronically signed by Tye Bowie RN on 7/28/2023 at 3:00 PM
Case Management Assessment  Initial Evaluation    Date/Time of Evaluation: 7/25/2023 3:59 PM  Assessment Completed by: JOON Werner    If patient is discharged prior to next notation, then this note serves as note for discharge by case management. Patient Name: Romaine Baker                   YOB: 1951  Diagnosis: No admission diagnoses are documented for this encounter. Date / Time: 7/25/2023 12:11 PM    Patient Admission Status: Emergency   Readmission Risk (Low < 19, Mod (19-27), High > 27): No data recorded  Current PCP: David Mello, DO  PCP verified by CM? Yes    Chart Reviewed: Yes      History Provided by: Patient  Patient Orientation: Alert and Oriented, Person, Place, Situation, Self    Patient Cognition: Alert    Hospitalization in the last 30 days (Readmission):  No    If yes, Readmission Assessment in CM Navigator will be completed. Advance Directives:      Code Status: Not on file   Patient's Primary Decision Maker is: Legal Next of Kin    Primary Decision MakerMessi May - Child - [phone]    Discharge Planning:    Patient lives with:   Type of Home:    Primary Care Giver: Self  Patient Support Systems include:     Current Financial resources:    Current community resources:    Current services prior to admission:              Current DME:              Type of Home Care services:       ADLS  Prior functional level: Independent in ADLs/IADLs  Current functional level: Independent in ADLs/IADLs    PT AM-PAC:   /24  OT AM-PAC:   /24    Family can provide assistance at DC: No  Would you like Case Management to discuss the discharge plan with any other family members/significant others, and if so, who?  No  Plans to Return to Present Housing: Yes  Other Identified Issues/Barriers to RETURNING to current housing: none  Potential Assistance needed at discharge:              Potential DME:    Patient expects to discharge to:    Plan for
OR 4